# Patient Record
Sex: MALE | Race: WHITE
[De-identification: names, ages, dates, MRNs, and addresses within clinical notes are randomized per-mention and may not be internally consistent; named-entity substitution may affect disease eponyms.]

---

## 2020-10-20 ENCOUNTER — HOSPITAL ENCOUNTER (EMERGENCY)
Dept: HOSPITAL 62 - ER | Age: 40
Discharge: HOME | End: 2020-10-20
Payer: COMMERCIAL

## 2020-10-20 VITALS — DIASTOLIC BLOOD PRESSURE: 91 MMHG | SYSTOLIC BLOOD PRESSURE: 130 MMHG

## 2020-10-20 DIAGNOSIS — Z23: ICD-10-CM
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
## 2020-10-20 DIAGNOSIS — S51.812A: Primary | ICD-10-CM
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
## 2020-10-20 LAB
BARBITURATES UR QL SCN: NEGATIVE
METHADONE UR QL SCN: NEGATIVE
PCP UR QL SCN: NEGATIVE
URINE AMPHETAMINES SCREEN: NEGATIVE
URINE BENZODIAZEPINES SCREEN: NEGATIVE
URINE COCAINE SCREEN: NEGATIVE
URINE MARIJUANA (THC) SCREEN: (no result)

## 2020-10-20 PROCEDURE — 99284 EMERGENCY DEPT VISIT MOD MDM: CPT

## 2020-10-20 PROCEDURE — 73090 X-RAY EXAM OF FOREARM: CPT

## 2020-10-20 PROCEDURE — 12002 RPR S/N/AX/GEN/TRNK2.6-7.5CM: CPT

## 2020-10-20 PROCEDURE — 90715 TDAP VACCINE 7 YRS/> IM: CPT

## 2020-10-20 PROCEDURE — 80307 DRUG TEST PRSMV CHEM ANLYZR: CPT

## 2020-10-20 PROCEDURE — 96372 THER/PROPH/DIAG INJ SC/IM: CPT

## 2020-10-20 NOTE — PDOC CONSULTATION
Consultation


Consult Date: 10/20/20


Provider Consulted: HEENA MAGALLANES





History of Present Illness


Patient complains of: Left forearm laceration


History of Present Illness: 


TAYLOR ABURTO is a 39 year old male who was at work when he sustained a 

laceration to his forearm when a pallet fell on his arm a piece of metal cut his

arm.  He was first seen in urgent care where there is notable bleeding he was 

then sent to the emergency room.  While in the emergency room area was irrigated

and explored there was a bleeder proximally which was tied off in a distal 

bleeder which continued to bleed but they were able to adequately close the 

wound but he developed a hematoma after closure.  It has now been 2 hours since 

closure and the swelling has not changed.  Patient denies numbness or tingling. 

Does have some pain with motion of his fingers.  Pain 2/10.  Patient received IV

antibiotics in the emergency room.





Social History


Smoking Status: Never Smoker





Family History


Family History: Reviewed & Not Pertinent


Parental Family History Reviewed: No


Children Family History Reviewed: No


Sibling(s) Family History Reviewed.: No





Medication/Allergy


Allergies/Adverse Reactions: 


                                        





No Known Allergies Allergy (Verified 10/20/20 13:22)


   











Review of Systems


Constitutional: ABSENT: chills, fever(s), headache(s), weight gain, weight loss


Eyes: ABSENT: visual disturbances


Ears: ABSENT: hearing changes


Cardiovascular: ABSENT: chest pain, dyspnea on exertion, edema, orthropnea, 

palpitations


Respiratory: ABSENT: cough, hemoptysis


Gastrointestinal: ABSENT: abdominal pain, constipation, diarrhea, hematemesis, 

hematochezia, nausea, vomiting


Genitourinary: ABSENT: dysuria, hematuria


Musculoskeletal: PRESENT: as per HPI


Integumentary: ABSENT: rash, wounds


Neurological: ABSENT: abnormal gait, abnormal speech, confusion, dizziness, 

focal weakness, syncope


Psychiatric: ABSENT: anxiety, depression, homidical ideation, suicidal ideation


Endocrine: ABSENT: cold intolerance, heat intolerance, menstrual abnormalities, 

polydipsia, polyuria


Hematologic/Lymphatic: ABSENT: easy bleeding, easy bruising, lymphadenopathy





Physical Exam


Vital Signs: 


                                        











Temp Pulse Resp BP Pulse Ox


 


 98.4 F   74   16   139/63 H  97 


 


 10/20/20 12:23  10/20/20 12:23  10/20/20 12:23  10/20/20 12:23  10/20/20 12:23








                                 Intake & Output











 10/19/20 10/20/20 10/21/20





 06:59 06:59 06:59


 


Weight   63.503 kg











General appearance: PRESENT: no acute distress, well-developed, well-nourished


Head exam: PRESENT: atraumatic, normocephalic


Eye exam: PRESENT: conjunctiva pink, EOMI, PERRLA.  ABSENT: scleral icterus


Ear exam: PRESENT: normal external ear exam


Neck exam: ABSENT: carotid bruit, JVD, lymphadenopathy, thyromegaly


Respiratory exam: PRESENT: unlabored


Cardiovascular exam: PRESENT: RRR.  ABSENT: diastolic murmur, rubs, systolic 

murmur


Pulses: PRESENT: other - Normal radial/ulnar pulse


Vascular exam: PRESENT: normal capillary refill


GI/Abdominal exam: PRESENT: normal bowel sounds, soft.  ABSENT: distended, 

guarding, mass, organolmegaly, rebound, tenderness


Rectal exam: PRESENT: deferred


Musculoskeletal exam: PRESENT: other - Left forearm: 6 cm laceration along the 

dorsal aspect of the forearm just ulnarly and on the midportion.  Wound well 

approximated.  No active bleeding.  Notable hematoma along the laceration site 

unable to express bleeding from the wound.  Compartments soft and compressible 

no sign of compartment syndrome.  No sensory deficits.  Patient has full IP/MP 

flexion/extension.  Able to make full composite fist.  Full extension IP/MP 

joints against resistance.  EPL/FPL intact.


Neurological exam: PRESENT: alert, awake, oriented to person, oriented to place,

oriented to time, oriented to situation, CN II-XII grossly intact.  ABSENT: 

motor sensory deficit


Psychiatric exam: PRESENT: appropriate affect, normal mood.  ABSENT: homicidal 

ideation, suicidal ideation


Skin exam: PRESENT: dry, intact, warm.  ABSENT: cyanosis, rash





Results


Impressions: 


                                        





Forearm X-Ray  10/20/20 13:27


IMPRESSION:  No foreign body.  No acute fracture.


 











Status: Image reviewed by me - I have reviewed patient's radiographs consistent 

with mild forearm swelling no evidence of acute fracture.





Assessment & Plan





- Diagnosis


(1) Laceration of left forearm


Qualifiers: 


   Encounter type: initial encounter   Qualified Code(s): S51.812A - Laceration 

without foreign body of left forearm, initial encounter   


Is this a current diagnosis for this admission?: Yes   


Plan: 


Patient sustained a left forearm laceration.  There was bleeding however given 

location of the injury this is likely a venous bleeder as opposed to arterial 

bleeder.  His hematoma has essentially stabilized over the past 2 hours and has 

not worsened.  There is no sign or symptoms of compartment syndrome currently.  

However if patient notices increasing redness swelling, pain he should present 

to the emergency room for further evaluation.  Patient will follow up with me in

2 weeks for suture removal.  Patient will be started on Keflex for bacterial 

prophylaxis.

## 2020-10-20 NOTE — RADIOLOGY REPORT (SQ)
EXAM DESCRIPTION:  FOREARM LEFT



COMPLETED DATE/TIME:  10/20/2020 1:47 pm



REASON FOR STUDY:  laceration, eval FB



COMPARISON:  None.



NUMBER OF VIEWS:  Two views.



TECHNIQUE:  Two radiographic images acquired of the left forearm, including elbow and wrist in at patty
st one projection.



LIMITATIONS:  None.



FINDINGS:  MINERALIZATION: Normal.

BONES: No acute fracture.  No worrisome bone lesions.

SOFT TISSUES: No obvious swelling or foreign body.

OTHER: No other significant finding.



IMPRESSION:  No foreign body.  No acute fracture.



TECHNICAL DOCUMENTATION:  JOB ID:  [id number]

 2011 [employer]- All Rights Reserved



Reading location - IP/workstation name: [email or web address]